# Patient Record
Sex: MALE | Race: WHITE | Employment: OTHER | URBAN - METROPOLITAN AREA
[De-identification: names, ages, dates, MRNs, and addresses within clinical notes are randomized per-mention and may not be internally consistent; named-entity substitution may affect disease eponyms.]

---

## 2024-07-01 ENCOUNTER — OFFICE VISIT (OUTPATIENT)
Dept: INFECTIOUS DISEASES | Age: 79
End: 2024-07-01
Payer: MEDICARE

## 2024-07-01 VITALS
HEART RATE: 71 BPM | SYSTOLIC BLOOD PRESSURE: 154 MMHG | HEIGHT: 66 IN | DIASTOLIC BLOOD PRESSURE: 83 MMHG | TEMPERATURE: 97 F | WEIGHT: 140.8 LBS | BODY MASS INDEX: 22.63 KG/M2

## 2024-07-01 DIAGNOSIS — Z51.89 ENCOUNTER FOR POST-TRAUMATIC WOUND CHECK: Primary | ICD-10-CM

## 2024-07-01 PROCEDURE — 99203 OFFICE O/P NEW LOW 30 MIN: CPT | Performed by: INTERNAL MEDICINE

## 2024-07-01 PROCEDURE — 1123F ACP DISCUSS/DSCN MKR DOCD: CPT | Performed by: INTERNAL MEDICINE

## 2024-07-01 RX ORDER — ASPIRIN 81 MG/1
81 TABLET ORAL DAILY
COMMUNITY

## 2024-07-01 RX ORDER — HYDRALAZINE HYDROCHLORIDE 10 MG/1
10 TABLET, FILM COATED ORAL 2 TIMES DAILY
COMMUNITY

## 2024-07-01 RX ORDER — AMLODIPINE BESYLATE 2.5 MG/1
2.5 TABLET ORAL DAILY
COMMUNITY

## 2024-07-01 NOTE — PROGRESS NOTES
[facility]   Office Consult Note  Today's Date and Time: 7/1/2024, 9:33 AM    Impression:     1. Encounter for post-traumatic wound check         Recommendations   The patient is already on oral antimicrobial therapy with cephalexin and have asked him to complete the course of this but again at this point in time no evidence of overt infection   Continue topical wound care with bacitracin and zinc  It should be okay for him to take showers  The patient can follow-up in a wound care clinic/center  If there are any signs of infection including erythema, drainage, increasing pain, fevers or chills the patient is to alert our office    I have ordered the following medications/ labs:  No orders of the defined types were placed in this encounter.     No orders of the defined types were placed in this encounter.      Chief complaint/reason for consultation:   No chief complaint on file.       History of Present Illness:   Lele Hernandez is a 79 y.o.-year-old male who is seen at there request of No ref. provider found    Lele has high blood pressure and hyperlipidemia and a history of basal cell carcinoma of the forehead that was excised in the past    Earlier this week he was helping a friend pull out a boat from the water when he sustained a traumatic injury to the left lower extremity and he was seen at an outlying facility where the wound was closed and he was prescribed some antibiotic therapy     There was concern for secondary infection and the patient was brought in for further evaluation.    He is not having any fevers, chills or sweats no cough or shortness of breath no chest pains or palpitations    I have personally reviewed the past medical history,past surgical history, medications, social history, and family history, and I have updated the database accordingly.  PastMedical History:     Past Medical History:   Diagnosis Date    HIGH CHOLESTEROL     Hypertension      Past Surgical  History: